# Patient Record
Sex: FEMALE | Race: WHITE
[De-identification: names, ages, dates, MRNs, and addresses within clinical notes are randomized per-mention and may not be internally consistent; named-entity substitution may affect disease eponyms.]

---

## 2018-03-15 ENCOUNTER — APPOINTMENT (OUTPATIENT)
Dept: UROLOGY | Facility: CLINIC | Age: 48
End: 2018-03-15

## 2018-04-25 ENCOUNTER — TRANSCRIPTION ENCOUNTER (OUTPATIENT)
Age: 48
End: 2018-04-25

## 2019-12-10 ENCOUNTER — TRANSCRIPTION ENCOUNTER (OUTPATIENT)
Age: 49
End: 2019-12-10

## 2020-04-30 ENCOUNTER — TRANSCRIPTION ENCOUNTER (OUTPATIENT)
Age: 50
End: 2020-04-30

## 2020-05-08 ENCOUNTER — APPOINTMENT (OUTPATIENT)
Dept: OTOLARYNGOLOGY | Facility: CLINIC | Age: 50
End: 2020-05-08

## 2020-05-12 ENCOUNTER — APPOINTMENT (OUTPATIENT)
Dept: OTOLARYNGOLOGY | Facility: CLINIC | Age: 50
End: 2020-05-12
Payer: MEDICAID

## 2020-05-12 VITALS
BODY MASS INDEX: 34.02 KG/M2 | WEIGHT: 192 LBS | HEART RATE: 102 BPM | HEIGHT: 63 IN | SYSTOLIC BLOOD PRESSURE: 150 MMHG | DIASTOLIC BLOOD PRESSURE: 101 MMHG | TEMPERATURE: 97.5 F

## 2020-05-12 DIAGNOSIS — H92.01 OTALGIA, RIGHT EAR: ICD-10-CM

## 2020-05-12 DIAGNOSIS — M26.621 ARTHRALGIA OF RIGHT TEMPOROMANDIBULAR JOINT: ICD-10-CM

## 2020-05-12 DIAGNOSIS — L29.9 PRURITUS, UNSPECIFIED: ICD-10-CM

## 2020-05-12 DIAGNOSIS — H61.23 IMPACTED CERUMEN, BILATERAL: ICD-10-CM

## 2020-05-12 PROCEDURE — G0268 REMOVAL OF IMPACTED WAX MD: CPT

## 2020-05-12 PROCEDURE — 99204 OFFICE O/P NEW MOD 45 MIN: CPT | Mod: 25

## 2020-05-12 PROCEDURE — 92567 TYMPANOMETRY: CPT

## 2020-05-12 PROCEDURE — 92557 COMPREHENSIVE HEARING TEST: CPT

## 2020-05-12 RX ORDER — MOMETASONE FUROATE 1 MG/G
0.1 CREAM TOPICAL DAILY
Qty: 1 | Refills: 0 | Status: ACTIVE | COMMUNITY
Start: 2020-05-12 | End: 1900-01-01

## 2020-05-12 NOTE — CONSULT LETTER
[Dear  ___] : Dear  [unfilled], [Consult Letter:] : I had the pleasure of evaluating your patient, [unfilled]. [Please see my note below.] : Please see my note below. [Consult Closing:] : Thank you very much for allowing me to participate in the care of this patient.  If you have any questions, please do not hesitate to contact me. [Sincerely,] : Sincerely, [FreeTextEntry3] : Jairo Dubose MD\par [facility]\par Otolaryngology and Facial Plastics\par [employer] Professor, [employer]\par

## 2020-05-12 NOTE — REASON FOR VISIT
[Initial Consultation] : an initial consultation for [FreeTextEntry2] : right ear, itching, infections

## 2020-05-12 NOTE — REVIEW OF SYSTEMS
[Sneezing] : sneezing [Seasonal Allergies] : seasonal allergies [Post Nasal Drip] : post nasal drip [Ear Pain] : ear pain [Ear Itch] : ear itch [Recurrent Ear Infections] : recurrent ear infections [Eyes Itch] : itching of the eyes [Joint Pain] : joint pain [Anxiety] : anxiety [Depression] : depression [Muscle Weakness] : muscle weakness [Negative] : Heme/Lymph

## 2020-05-12 NOTE — ASSESSMENT
[FreeTextEntry1] : Pt complaining right ear pain and itching. On exan cerumen removed mild right sided TMJ audiogram wnl nl early presycusis. doing well. reassured given elocan cream will f/u as needed.

## 2020-05-12 NOTE — HISTORY OF PRESENT ILLNESS
[de-identified] : Patient started to have right ear pain and itching in the right ear and was givern ofloxacin. After using this for a few weeks she did not have any improvement. Eventually she went back to her doctor and was given augmentin for 10 days. \par She was told that  she had wax in the ears that was dry. SHe is having moderate dull aching pain that comes and goes and she has a lot of desire to scratch the right ear due to itching. SHe does not think that her hearing has changed at all since this began. She does not have any ringing in the ears or dizziness since this started. SHe does admit that she has a lot of anxiety and this has worsened it. SHe states this started when she was asleep in December, woke up, touched her ear after touching the floor. She does not have any nasal congestion or runny nose today

## 2020-11-17 ENCOUNTER — EMERGENCY (EMERGENCY)
Facility: HOSPITAL | Age: 50
LOS: 0 days | Discharge: HOME | End: 2020-11-17
Attending: EMERGENCY MEDICINE
Payer: MEDICAID

## 2020-11-17 VITALS
HEART RATE: 88 BPM | OXYGEN SATURATION: 100 % | SYSTOLIC BLOOD PRESSURE: 177 MMHG | TEMPERATURE: 99 F | RESPIRATION RATE: 18 BRPM | DIASTOLIC BLOOD PRESSURE: 96 MMHG

## 2020-11-17 VITALS — WEIGHT: 190.04 LBS | HEIGHT: 63 IN

## 2020-11-17 DIAGNOSIS — I10 ESSENTIAL (PRIMARY) HYPERTENSION: ICD-10-CM

## 2020-11-17 DIAGNOSIS — R51.9 HEADACHE, UNSPECIFIED: ICD-10-CM

## 2020-11-17 DIAGNOSIS — K04.7 PERIAPICAL ABSCESS WITHOUT SINUS: ICD-10-CM

## 2020-11-17 DIAGNOSIS — Z91.040 LATEX ALLERGY STATUS: ICD-10-CM

## 2020-11-17 DIAGNOSIS — F41.9 ANXIETY DISORDER, UNSPECIFIED: ICD-10-CM

## 2020-11-17 PROCEDURE — 99283 EMERGENCY DEPT VISIT LOW MDM: CPT

## 2020-11-17 RX ORDER — OXYCODONE HYDROCHLORIDE 5 MG/1
5 TABLET ORAL ONCE
Refills: 0 | Status: DISCONTINUED | OUTPATIENT
Start: 2020-11-17 | End: 2020-11-17

## 2020-11-17 RX ORDER — IBUPROFEN 200 MG
1 TABLET ORAL
Qty: 20 | Refills: 0
Start: 2020-11-17

## 2020-11-17 RX ORDER — PENICILLIN V POTASSIUM 250 MG
1 TABLET ORAL
Qty: 21 | Refills: 0
Start: 2020-11-17 | End: 2020-11-23

## 2020-11-17 RX ORDER — ACETAMINOPHEN 500 MG
1000 TABLET ORAL ONCE
Refills: 0 | Status: COMPLETED | OUTPATIENT
Start: 2020-11-17 | End: 2020-11-17

## 2020-11-17 RX ORDER — IBUPROFEN 200 MG
600 TABLET ORAL ONCE
Refills: 0 | Status: COMPLETED | OUTPATIENT
Start: 2020-11-17 | End: 2020-11-17

## 2020-11-17 RX ADMIN — OXYCODONE HYDROCHLORIDE 5 MILLIGRAM(S): 5 TABLET ORAL at 17:51

## 2020-11-17 RX ADMIN — Medication 1 TABLET(S): at 17:50

## 2020-11-17 RX ADMIN — Medication 600 MILLIGRAM(S): at 17:51

## 2020-11-17 RX ADMIN — Medication 1000 MILLIGRAM(S): at 17:51

## 2020-11-17 NOTE — ED STATDOCS - PHYSICAL EXAMINATION
*GEN: No acute distress, well appearing   *HEAD: Normocephalic, Atraumatic +right sided facial swelling, TTP, minimal fluctuance and mild redness   *EYES/NOSE: b/l Pupils symmetric & Reactive to ligth, EOMI b/l  *THROAT: airway patent, moist mucous membranes +right lower molar cavities with decaying tooth   *NECK: Neck supple  *PULMONARY: No Respiratory distress, symmetric b/l chest rise  *CARDIAC: s1s2, regular rhythm   *ABDOMEN:  Non Tender, Non Distended, soft, no guarding, no rebound, no masses   *BACK: no CVA tenderness, No midline vertebral tenderness to palpation   *EXTREMITIES: symmetric pulses, 2+ DP & radial pulses, no cyanosis, no edema   *SKIN: no rash, no bruising   *NEUROLOGIC: alert,  full active & passive ROM in all 4 extremities,   *PSYCH: appropriate concern about symptoms, pleasant

## 2020-11-17 NOTE — ED STATDOCS - PROGRESS NOTE DETAILS
signed Lsia Soliman PA-C Pt seen initially in intake by Dr. Castillo. Pt declines  services.   49F c/o right sided dental pain, likely early abscess, has dentist appt on 11/19. Pt afebrile, mild right sided facial swelling, pt alert, non toxic, no trismus. DC with rx motrin, perocet, pen vk. Pt feeling well at DC, agrees with DC and plan of care. return precautions given. pt getting ride home from son. signed Lisa Soliman PA-C Pt seen initially in intake by Dr. Castillo. Pt declines  services.   49F c/o right sided dental pain, likely early abscess, has dentist appt on 11/19. Pt afebrile, mild right sided facial swelling, pt alert, non toxic, no trismus. DC with rx motrin, percocet, augmentin. Pt feeling well at DC, agrees with DC and plan of care. return precautions given. pt getting ride home from son.

## 2020-11-17 NOTE — ED STATDOCS - ATTENDING CONTRIBUTION TO CARE
I, Allan Castillo MD,  performed the initial face to face bedside interview with this patient regarding history of present illness, review of symptoms and relevant past medical, social and family history.  I completed an independent physical examination.  I was the initial provider who evaluated this patient. I have signed out the follow up of any pending tests (i.e. labs, radiological studies) to the ACP.  The ACP will complete the work up, interpret tests, administer medications if necessary and reassess the patient for an appropriate disposition.  If questions arise the ACP is expected to contact me for consultation. In the current work-flow I usually don't get to speak to nor reassess patients for final disposition once I sign the case out to the ACP (Unless otherwise documented).

## 2020-11-17 NOTE — ED ADULT NURSE NOTE - OBJECTIVE STATEMENT
Pt c/o right side face swelling radiating to her neck . pt has appointment with  dentist in 2 days .was send from urgent care denies fever .

## 2020-11-17 NOTE — ED STATDOCS - NSFOLLOWUPINSTRUCTIONS_ED_ALL_ED_FT
Dental Abscess       A dental abscess is a collection of pus in or around a tooth that results from an infection. An abscess can cause pain in the affected area as well as other symptoms. Treatment is important to help with symptoms and to prevent the infection from spreading.      What are the causes?  This condition is caused by a bacterial infection around the root of the tooth that involves the inner part of the tooth (pulp). It may result from:  •Severe tooth decay.      •Trauma to the tooth, such as a broken or chipped tooth, that allows bacteria to enter into the pulp.      •Severe gum disease around a tooth.        What increases the risk?  This condition is more likely to develop in males. It is also more likely to develop in people who:  •Have dental decay (cavities).      •Eat sugary snacks between meals.      •Use tobacco products.      •Have diabetes.      •Have a weakened disease-fighting system (immune system).      •Do not brush and care for their teeth regularly.        What are the signs or symptoms?  Symptoms of this condition include:  •Severe pain in and around the infected tooth.      •Swelling and redness around the infected tooth, in the mouth, or in the face.      •Tenderness.      •Pus drainage.      •Bad breath.      •Bitter taste in the mouth.      •Difficulty swallowing.      •Difficulty opening the mouth.      •Nausea.      •Vomiting.      •Chills.      •Swollen neck glands.      •Fever.        How is this diagnosed?  This condition is diagnosed based on:  •Your symptoms and your medical and dental history.      •An examination of the infected tooth. During the exam, your dentist may tap on the infected tooth.      You may also have X-rays of the affected area.      How is this treated?  This condition is treated by getting rid of the infection. This may be done with:  •Incision and drainage. This procedure is done by making an incision in the abscess to drain out the pus. Removing pus is the first priority in treating an abscess.      •Antibiotic medicines. These may be used in certain situations.      •Antibacterial mouth rinse.      •A root canal. This may be performed to save the tooth. Your dentist accesses the visible part of your tooth (crown) with a drill and removes any damaged pulp. Then the space is filled and sealed off.      •Tooth extraction. The tooth is pulled out if it cannot be saved by other treatment.    You may also receive treatment for pain, such as:  •Acetaminophen or NSAIDs.      •Gels that contain a numbing medicine.      •An injection to block the pain near your nerve.        Follow these instructions at home:    Medicines     •Take over-the-counter and prescription medicines only as told by your dentist.      •If you were prescribed an antibiotic, take it as told by your dentist. Do not stop taking the antibiotic even if you start to feel better.      •If you were prescribed a gel that contains a numbing medicine, use it exactly as told in the directions. Do not use these gels for children who are younger than 2 years of age.      • Do not drive or use heavy machinery while taking prescription pain medicine.      General instructions     •Rinse out your mouth often with salt water to relieve pain or swelling. To make a salt-water mixture, completely dissolve ½–1 tsp of salt in 1 cup of warm water.      •Eat a soft diet while your abscess is healing.      •Drink enough fluid to keep your urine pale yellow.      • Do not apply heat to the outside of your mouth.      • Do not use any products that contain nicotine or tobacco, such as cigarettes and e-cigarettes. If you need help quitting, ask your health care provider.      •Keep all follow-up visits as told by your dentist. This is important.        How is this prevented?    •Brush your teeth every morning and night with fluoride toothpaste. Floss one time each day.      •Get regularly scheduled dental cleanings.      •Consider having a dental sealant applied on teeth that have deep holes (caries).      •Drink fluoridated water regularly. This includes most tap water. Check the label on bottled water to see if it contains fluoride.      •Drink water instead of sugary drinks.      •Eat healthy meals and snacks.      •Wear a mouth guard or face shield to protect your teeth while playing sports.        Contact a health care provider if:    •Your pain is worse and is not helped by medicine.        Get help right away if:    •You have a fever or chills.      •Your symptoms suddenly get worse.      •You have a very bad headache.      •You have problems breathing or swallowing.      •You have trouble opening your mouth.      •You have swelling in your neck or around your eye.        Summary    •A dental abscess is a collection of pus in or around a tooth that results from an infection.      •A dental abscess may result from severe tooth decay, trauma to the tooth, or severe gum disease around a tooth.      •Symptoms include severe pain, swelling, redness, and drainage of pus in and around the infected tooth.      •The first priority in treating a dental abscess is to drain out the pus. Treatment may also involve removing damage inside the tooth (root canal) or pulling out (extracting) the tooth.      This information is not intended to replace advice given to you by your health care provider. Make sure you discuss any questions you have with your health care provider.      Document Revised: 11/30/2018 Document Reviewed: 08/20/2018    [employer] Patient Education © 2020 [employer]    FOLLOW UP WITH YOUR DENTIST IN 1-2 DAYS. RETURN TO ER FOR ANY WORSENING SYMPTOMS OR NEW CONCERNS.   TAKE THE ANTIBIOTICS EVERY DAY. FOR PAIN, TAKE THE MOTRIN EVERY 6 HOURS. IF PAIN IS SEVERE TAKE THE PERCOCET AS WELL, IT IS OK TO COMBINE THE MOTRIN AND PERCOCET.

## 2020-11-17 NOTE — ED ADULT NURSE NOTE - CHIEF COMPLAINT QUOTE
Patient comes in with R sided facial pain and neck pain. Patient sent in from urgent care to r/o infection. 16-Aug-2017 15:51:16

## 2020-11-17 NOTE — ED STATDOCS - NS ED ROS FT
Review of Systems:  	•	CONSTITUTIONAL: no fever  	•	SKIN: no rash +facial pain, swelling                              MOUTH: +right sided tooth pain  	•	RESPIRATORY: no shortness of breath  	•	CARDIAC: no chest pain  	•	GI:  no abd pain, no nausea, no vomiting, no diarrhea  	•	GENITO-URINARY:  no dysuria  	•	MUSCULOSKELETAL:  no back pain  	•	NEUROLOGIC: no weakness  	•	ALLERGY: no rhinorrhea  	•	PSYSCHIATRIC: appropriate concern about symptoms

## 2020-11-17 NOTE — ED ADULT TRIAGE NOTE - CHIEF COMPLAINT QUOTE
Patient comes in with R sided facial pain and neck pain. Patient sent in from urgent care to r/o infection.

## 2020-11-17 NOTE — ED STATDOCS - OBJECTIVE STATEMENT
Pertinent HPI/PMH/PSH/FHx/SHx and Review of Systems contained within  HPI:  Patient p/w CC right sided facial pain, swelling x yesterday, new onset. Pt states she was brushing her teeth roughly last night, prior to the pain starting. Associated right sided tooth pain. Pt states Sent in from Urgent Care to r/o infection. Has a dentist appointment in two days.   PMH/PSH relevant for: HTN, anxiety, periodontitis, congential adrenal hyperplasia   ROS negative for: fever, Chest pain, SOB, Nausea, vomiting, diarrhea, abdominal pain, dysuria    FamilyHx and SocialHx not otherwise contributory Pertinent HPI/PMH/PSH/FHx/SHx and Review of Systems contained within  HPI:  Patient p/w CC right sided facial pain, swelling x yesterday, new onset. Pt states she was brushing her teeth roughly last night, prior to the pain starting. Associated right sided tooth pain. Pt states Sent in from Urgent Care to r/o infection. Has a dentist appointment in two days. NKDA.   PMH/PSH relevant for: HTN, anxiety, periodontitis, congential adrenal hyperplasia   ROS negative for: fever, Chest pain, SOB, Nausea, vomiting, diarrhea, abdominal pain, dysuria    FamilyHx and SocialHx not otherwise contributory

## 2020-11-17 NOTE — ED STATDOCS - PATIENT PORTAL LINK FT
You can access the FollowMyHealth Patient Portal offered by [facility] by registering at the following website: [email or web address]. By joining [employer]’s FollowMyHealth portal, you will also be able to view your health information using other applications (apps) compatible with our system.

## 2020-11-17 NOTE — ED ADULT NURSE NOTE - NSIMPLEMENTINTERV_GEN_ALL_ED
Implemented All Universal Safety Interventions:  [place] to call system. Call bell, personal items and telephone within reach. Instruct patient to call for assistance. Room bathroom lighting operational. Non-slip footwear when patient is off stretcher. Physically safe environment: no spills, clutter or unnecessary equipment. Stretcher in lowest position, wheels locked, appropriate side rails in place.

## 2020-11-17 NOTE — ED STATDOCS - CLINICAL SUMMARY MEDICAL DECISION MAKING FREE TEXT BOX
Symptoms likely due to beginnings of buccal space abscess and infection, which is not fluctuant, does not appear ready to be drained will start on abx. Pt has dental follow up in 2 days. Symptoms likely due to beginnings of buccal space abscess and infection, which is not fluctuant, does not appear ready to be drained will start on abx. Pt has dental follow up in 2 days. pt prefers to be started on antibiotics prior to I&D

## 2020-11-18 ENCOUNTER — INPATIENT (INPATIENT)
Facility: HOSPITAL | Age: 50
LOS: 1 days | Discharge: HOME | DRG: 114 | End: 2020-11-20
Attending: HOSPITALIST | Admitting: STUDENT IN AN ORGANIZED HEALTH CARE EDUCATION/TRAINING PROGRAM
Payer: MEDICAID

## 2020-11-18 VITALS — WEIGHT: 190.04 LBS | HEIGHT: 63 IN

## 2020-11-18 DIAGNOSIS — K04.7 PERIAPICAL ABSCESS WITHOUT SINUS: ICD-10-CM

## 2020-11-18 DIAGNOSIS — Z90.89 ACQUIRED ABSENCE OF OTHER ORGANS: Chronic | ICD-10-CM

## 2020-11-18 LAB
ALBUMIN SERPL ELPH-MCNC: 3.8 G/DL — SIGNIFICANT CHANGE UP (ref 3.3–5)
ALP SERPL-CCNC: 71 U/L — SIGNIFICANT CHANGE UP (ref 40–120)
ALT FLD-CCNC: 22 U/L — SIGNIFICANT CHANGE UP (ref 12–78)
ANION GAP SERPL CALC-SCNC: 5 MMOL/L — SIGNIFICANT CHANGE UP (ref 5–17)
AST SERPL-CCNC: 8 U/L — LOW (ref 15–37)
BASOPHILS # BLD AUTO: 0.06 K/UL — SIGNIFICANT CHANGE UP (ref 0–0.2)
BASOPHILS NFR BLD AUTO: 0.6 % — SIGNIFICANT CHANGE UP (ref 0–2)
BILIRUB SERPL-MCNC: 0.3 MG/DL — SIGNIFICANT CHANGE UP (ref 0.2–1.2)
BUN SERPL-MCNC: 15 MG/DL — SIGNIFICANT CHANGE UP (ref 7–23)
CALCIUM SERPL-MCNC: 9.1 MG/DL — SIGNIFICANT CHANGE UP (ref 8.5–10.1)
CHLORIDE SERPL-SCNC: 111 MMOL/L — HIGH (ref 96–108)
CO2 SERPL-SCNC: 27 MMOL/L — SIGNIFICANT CHANGE UP (ref 22–31)
CREAT SERPL-MCNC: 0.83 MG/DL — SIGNIFICANT CHANGE UP (ref 0.5–1.3)
EOSINOPHIL # BLD AUTO: 0.15 K/UL — SIGNIFICANT CHANGE UP (ref 0–0.5)
EOSINOPHIL NFR BLD AUTO: 1.4 % — SIGNIFICANT CHANGE UP (ref 0–6)
GLUCOSE SERPL-MCNC: 130 MG/DL — HIGH (ref 70–99)
HCT VFR BLD CALC: 42.1 % — SIGNIFICANT CHANGE UP (ref 34.5–45)
HGB BLD-MCNC: 13.9 G/DL — SIGNIFICANT CHANGE UP (ref 11.5–15.5)
IMM GRANULOCYTES NFR BLD AUTO: 0.4 % — SIGNIFICANT CHANGE UP (ref 0–1.5)
LYMPHOCYTES # BLD AUTO: 2.8 K/UL — SIGNIFICANT CHANGE UP (ref 1–3.3)
LYMPHOCYTES # BLD AUTO: 26.9 % — SIGNIFICANT CHANGE UP (ref 13–44)
MCHC RBC-ENTMCNC: 28 PG — SIGNIFICANT CHANGE UP (ref 27–34)
MCHC RBC-ENTMCNC: 33 GM/DL — SIGNIFICANT CHANGE UP (ref 32–36)
MCV RBC AUTO: 84.9 FL — SIGNIFICANT CHANGE UP (ref 80–100)
MONOCYTES # BLD AUTO: 0.64 K/UL — SIGNIFICANT CHANGE UP (ref 0–0.9)
MONOCYTES NFR BLD AUTO: 6.1 % — SIGNIFICANT CHANGE UP (ref 2–14)
NEUTROPHILS # BLD AUTO: 6.73 K/UL — SIGNIFICANT CHANGE UP (ref 1.8–7.4)
NEUTROPHILS NFR BLD AUTO: 64.6 % — SIGNIFICANT CHANGE UP (ref 43–77)
PLATELET # BLD AUTO: 260 K/UL — SIGNIFICANT CHANGE UP (ref 150–400)
POTASSIUM SERPL-MCNC: 3.6 MMOL/L — SIGNIFICANT CHANGE UP (ref 3.5–5.3)
POTASSIUM SERPL-SCNC: 3.6 MMOL/L — SIGNIFICANT CHANGE UP (ref 3.5–5.3)
PROT SERPL-MCNC: 8 GM/DL — SIGNIFICANT CHANGE UP (ref 6–8.3)
RBC # BLD: 4.96 M/UL — SIGNIFICANT CHANGE UP (ref 3.8–5.2)
RBC # FLD: 13.4 % — SIGNIFICANT CHANGE UP (ref 10.3–14.5)
SARS-COV-2 RNA SPEC QL NAA+PROBE: SIGNIFICANT CHANGE UP
SODIUM SERPL-SCNC: 143 MMOL/L — SIGNIFICANT CHANGE UP (ref 135–145)
WBC # BLD: 10.42 K/UL — SIGNIFICANT CHANGE UP (ref 3.8–10.5)
WBC # FLD AUTO: 10.42 K/UL — SIGNIFICANT CHANGE UP (ref 3.8–10.5)

## 2020-11-18 PROCEDURE — 70487 CT MAXILLOFACIAL W/DYE: CPT | Mod: 26

## 2020-11-18 PROCEDURE — 80061 LIPID PANEL: CPT

## 2020-11-18 PROCEDURE — 71045 X-RAY EXAM CHEST 1 VIEW: CPT | Mod: 26

## 2020-11-18 PROCEDURE — 80053 COMPREHEN METABOLIC PANEL: CPT

## 2020-11-18 PROCEDURE — 83735 ASSAY OF MAGNESIUM: CPT

## 2020-11-18 PROCEDURE — 93005 ELECTROCARDIOGRAM TRACING: CPT

## 2020-11-18 PROCEDURE — G0378: CPT

## 2020-11-18 PROCEDURE — 99223 1ST HOSP IP/OBS HIGH 75: CPT

## 2020-11-18 PROCEDURE — 93010 ELECTROCARDIOGRAM REPORT: CPT

## 2020-11-18 PROCEDURE — 84100 ASSAY OF PHOSPHORUS: CPT

## 2020-11-18 PROCEDURE — 83036 HEMOGLOBIN GLYCOSYLATED A1C: CPT

## 2020-11-18 PROCEDURE — 36415 COLL VENOUS BLD VENIPUNCTURE: CPT

## 2020-11-18 PROCEDURE — 85025 COMPLETE CBC W/AUTO DIFF WBC: CPT

## 2020-11-18 RX ORDER — ACETAMINOPHEN 500 MG
650 TABLET ORAL ONCE
Refills: 0 | Status: DISCONTINUED | OUTPATIENT
Start: 2020-11-18 | End: 2020-11-18

## 2020-11-18 RX ORDER — VENLAFAXINE HCL 75 MG
150 CAPSULE, EXT RELEASE 24 HR ORAL DAILY
Refills: 0 | Status: DISCONTINUED | OUTPATIENT
Start: 2020-11-18 | End: 2020-11-20

## 2020-11-18 RX ORDER — VENLAFAXINE HCL 75 MG
1 CAPSULE, EXT RELEASE 24 HR ORAL
Qty: 0 | Refills: 0 | DISCHARGE

## 2020-11-18 RX ORDER — SACCHAROMYCES BOULARDII 250 MG
250 POWDER IN PACKET (EA) ORAL
Refills: 0 | Status: DISCONTINUED | OUTPATIENT
Start: 2020-11-18 | End: 2020-11-20

## 2020-11-18 RX ORDER — OXYCODONE HYDROCHLORIDE 5 MG/1
5 TABLET ORAL EVERY 4 HOURS
Refills: 0 | Status: DISCONTINUED | OUTPATIENT
Start: 2020-11-18 | End: 2020-11-18

## 2020-11-18 RX ORDER — CETIRIZINE HYDROCHLORIDE 10 MG/1
1 TABLET ORAL
Qty: 0 | Refills: 0 | DISCHARGE

## 2020-11-18 RX ORDER — METOPROLOL TARTRATE 50 MG
1 TABLET ORAL
Qty: 0 | Refills: 0 | DISCHARGE

## 2020-11-18 RX ORDER — SERTRALINE 25 MG/1
100 TABLET, FILM COATED ORAL DAILY
Refills: 0 | Status: DISCONTINUED | OUTPATIENT
Start: 2020-11-18 | End: 2020-11-20

## 2020-11-18 RX ORDER — OXYBUTYNIN CHLORIDE 5 MG
1 TABLET ORAL
Qty: 0 | Refills: 0 | DISCHARGE

## 2020-11-18 RX ORDER — AMPICILLIN SODIUM AND SULBACTAM SODIUM 250; 125 MG/ML; MG/ML
3 INJECTION, POWDER, FOR SUSPENSION INTRAMUSCULAR; INTRAVENOUS EVERY 6 HOURS
Refills: 0 | Status: DISCONTINUED | OUTPATIENT
Start: 2020-11-18 | End: 2020-11-20

## 2020-11-18 RX ORDER — KETOROLAC TROMETHAMINE 30 MG/ML
30 SYRINGE (ML) INJECTION EVERY 6 HOURS
Refills: 0 | Status: DISCONTINUED | OUTPATIENT
Start: 2020-11-18 | End: 2020-11-20

## 2020-11-18 RX ORDER — OXYCODONE AND ACETAMINOPHEN 5; 325 MG/1; MG/1
1 TABLET ORAL ONCE
Refills: 0 | Status: DISCONTINUED | OUTPATIENT
Start: 2020-11-18 | End: 2020-11-18

## 2020-11-18 RX ORDER — LORATADINE 10 MG/1
10 TABLET ORAL DAILY
Refills: 0 | Status: DISCONTINUED | OUTPATIENT
Start: 2020-11-18 | End: 2020-11-20

## 2020-11-18 RX ORDER — OXYCODONE AND ACETAMINOPHEN 5; 325 MG/1; MG/1
1 TABLET ORAL EVERY 6 HOURS
Refills: 0 | Status: DISCONTINUED | OUTPATIENT
Start: 2020-11-18 | End: 2020-11-20

## 2020-11-18 RX ORDER — ACETAMINOPHEN 500 MG
650 TABLET ORAL EVERY 4 HOURS
Refills: 0 | Status: DISCONTINUED | OUTPATIENT
Start: 2020-11-18 | End: 2020-11-18

## 2020-11-18 RX ORDER — SERTRALINE 25 MG/1
1 TABLET, FILM COATED ORAL
Qty: 0 | Refills: 0 | DISCHARGE

## 2020-11-18 RX ORDER — OXYBUTYNIN CHLORIDE 5 MG
5 TABLET ORAL EVERY 8 HOURS
Refills: 0 | Status: DISCONTINUED | OUTPATIENT
Start: 2020-11-18 | End: 2020-11-20

## 2020-11-18 RX ORDER — METOPROLOL TARTRATE 50 MG
25 TABLET ORAL DAILY
Refills: 0 | Status: DISCONTINUED | OUTPATIENT
Start: 2020-11-18 | End: 2020-11-20

## 2020-11-18 RX ORDER — ACETAMINOPHEN 500 MG
650 TABLET ORAL EVERY 4 HOURS
Refills: 0 | Status: DISCONTINUED | OUTPATIENT
Start: 2020-11-18 | End: 2020-11-20

## 2020-11-18 RX ADMIN — Medication 100 MILLIGRAM(S): at 16:29

## 2020-11-18 RX ADMIN — AMPICILLIN SODIUM AND SULBACTAM SODIUM 200 GRAM(S): 250; 125 INJECTION, POWDER, FOR SUSPENSION INTRAMUSCULAR; INTRAVENOUS at 23:30

## 2020-11-18 NOTE — ED STATDOCS - CADM POA CENTRAL LINE
Called patient for refill reminder.    Will mail prescriptions address has been verified.         Last Filled on: 07/03/20   Follow-up Date: 08/27/20    Zuly Sampson CPhT  [facility]  [phone]        No

## 2020-11-18 NOTE — H&P ADULT - NSHPSOCIALHISTORY_GEN_ALL_CORE
Lives at home with son  Denies tobacco use  Denies EtOH use  No illicit drug use  No Mammo or PAP smear in a while due to anxiety

## 2020-11-18 NOTE — H&P ADULT - NSHPREVIEWOFSYSTEMS_GEN_ALL_CORE
General: no fever, chills  Eyes: no vision changes  ENT: no hearing changes, nasal congestion, or sore throat; admits right tooth pain and facial swelling  CV: no chest pain or palpitations  Pulm: no SOB, wheezing, cough, or hemoptysis  Abd/GI: no nausea, vomiting, diarrhea, constipation, abd pain  : no dysuria, hematuria, urinary frequency; vaginal itchiness  MSK: no joint pain or myalgias  Neuro: no syncope, dizziness, focal weakness  Psych: no anxiety or depression  Endo: no heat or cold intolerance

## 2020-11-18 NOTE — H&P ADULT - NSHPPHYSICALEXAM_GEN_ALL_CORE
T(C): 36.8 (18 Nov 2020 18:25), Max: 36.8 (18 Nov 2020 15:44)  T(F): 98.2 (18 Nov 2020 18:25), Max: 98.2 (18 Nov 2020 15:44)  HR: 80 (18 Nov 2020 18:25) (80 - 95)  BP: 138/70 (18 Nov 2020 18:25) (138/70 - 140/79)  BP(mean): 83 (18 Nov 2020 18:25) (83 - 96)  RR: 17 (18 Nov 2020 18:25) (17 - 18)  SpO2: 98% (18 Nov 2020 18:25) (98% - 98%)    Limited Physical via telemedicine; nocturnist assessment to follow  Gen: in no acute distress  HEENT: right sided facial swelling Physical Exam:   GENERAL APPEARANCE:  NAD, hemodynamically stable  T(C): 36.8 (11-18-20 @ 18:25), Max: 36.8 (11-18-20 @ 15:44)  HR: 72 (11-18-20 @ 23:01) (72 - 95)  BP: 132/65 (11-18-20 @ 23:01) (132/65 - 140/79)  RR: 16 (11-18-20 @ 23:01) (16 - 18)  SpO2: 98% (11-18-20 @ 23:01) (98% - 98%)  Wt(kg): --   Skin:  Warm and dry without any rash   NECK:  Supple without lymphadenopathy.   HEART:  Regular rate and rhythm. normal S1 and S2, No M/R/G  LUNGS:  Good ins/exp effort, no W/R/R/C  ABDOMEN:  Soft, nontender, nondistended with good bowel sounds heard  EXTREMITIES:  Without cyanosis, clubbing or edema.   NEUROLOGICAL:  Gross nonfocal   Gen: in no acute distress  HEENT: right sided facial swelling

## 2020-11-18 NOTE — ED STATDOCS - PROGRESS NOTE DETAILS
signed Lisa Soliman PA-C Pt seen initially in intake by Dr Flores.   49F c/o worsened facial swelling and pain, pt seen in ED yesterday for dental abscess, DCed on Augmentin, has dentist appt in 2 days but swelling worsened significantly today. Denies fever. No significant findings on labwork. CT shows small periapical abscess. Will admit for IV abx. Pt agrees with admission and plan of care. Case discussed with and admission accepted by hospitalist Dr. Reyes.

## 2020-11-18 NOTE — H&P ADULT - ASSESSMENT
50 y/o F with PMHx of HTN, anxiety, urinary incontinence admitted with dental abscess.    #Subperiosteal abscess  - admit to medicine  - CT with right maxillary second molar tooth findings concerning for abscess  - start unasyn q6hrs for now; probiotics while on abx  - follow up blood cultures  - monitor for fevers  - pain control with tylenol, percocet and toradol  - OMFS consult    #HTN  - continue toprol XL 25mg daily  - monitor hemodynamics    #Anxiety  - continue venlafaxine  - continue sertraline    #Urinary incontinence  - continue oxybutnin (5mg TID for 15mg ER)    #Vaginal candidiasis  - monitor - was prescribed fluconazole but told to hold    #Need for prophylactic measure  - VTE ppx: SCDs, ambulate as tolerated 50 y/o F with PMHx of HTN, anxiety, urinary incontinence admitted with dental abscess.    # Subperiosteal abscess  - admit to medicine  - CT with right maxillary second molar tooth findings concerning for abscess  - start unasyn q6hrs for now; probiotics while on abx  - follow up blood cultures  - monitor for fevers  - pain control with tylenol, percocet and toradol  - OMFS consult    #HTN  - continue toprol XL 25mg daily  - monitor hemodynamics    #Anxiety  - continue venlafaxine  - continue sertraline    #Urinary incontinence  - continue oxybutnin (5mg TID for 15mg ER)    #Vaginal candidiasis  - monitor - was prescribed fluconazole but told to hold    #Need for prophylactic measure  - VTE ppx: SCDs, ambulate as tolerated

## 2020-11-18 NOTE — H&P ADULT - HISTORY OF PRESENT ILLNESS
50 y/o F with PMHx of HTN, anxiety, urinary incontinence presented to the ED complaining of tooth pain. She started to feel pain on the right bottom side of her mouth the day before yesterday. Then, she started developing pain and swelling from the bottom of her face up to her right eye. She was having a lot pain and was taking anti-inflammatories - ibuprofen with improvement. She presented to the ED due to worsening pain and swelling after ER visit. She received oxycodone and augmentin. Still getting worse so she she came back. No fevers or chills. Denies chest pain, palpitations, dyspnea, headache, dizziness, abdominal pain, n/v/d. Has some vaginal itchiness, which she was given fluconazole for but was told not to start yet.    In the ED, VSS  Labs unremarkable  CXR with no acute disease  CT showed periapical lucencies involving the right maxillary second molar tooth with buccal cortex dehiscence and suggestion of a subcentimeter subperiosteal abscess. Associated superficial soft tissue swelling noted.

## 2020-11-18 NOTE — ED STATDOCS - OBJECTIVE STATEMENT
48 y/o female with a PMHx of HTN on Metoprolol, anxiety, periodontitis, congential adrenal hyperplasia, presents to the ED c/o worsening R sided facial swelling since yesterday morning. Pt was seen in ED yesterday for same and dx with dental abscess and rx'd Motrin, Percocet and Augmentin. States pain has improved but swelling seems to be worse. Pt denies fever. Pt has an appointment with her dentist tomorrow.

## 2020-11-18 NOTE — ED ADULT TRIAGE NOTE - CHIEF COMPLAINT QUOTE
pt c/o worsening R. sided facial pain and swelling since yesterday morning. pt reports she was seen in ED yesterday and dx w/ a facial abscess, told to come back if it got worse and now it is worse.

## 2020-11-19 LAB
A1C WITH ESTIMATED AVERAGE GLUCOSE RESULT: 5.9 % — HIGH (ref 4–5.6)
ALBUMIN SERPL ELPH-MCNC: 3.7 G/DL — SIGNIFICANT CHANGE UP (ref 3.3–5)
ALP SERPL-CCNC: 67 U/L — SIGNIFICANT CHANGE UP (ref 40–120)
ALT FLD-CCNC: 18 U/L — SIGNIFICANT CHANGE UP (ref 12–78)
ANION GAP SERPL CALC-SCNC: 6 MMOL/L — SIGNIFICANT CHANGE UP (ref 5–17)
AST SERPL-CCNC: 13 U/L — LOW (ref 15–37)
BASOPHILS # BLD AUTO: 0.04 K/UL — SIGNIFICANT CHANGE UP (ref 0–0.2)
BASOPHILS NFR BLD AUTO: 0.4 % — SIGNIFICANT CHANGE UP (ref 0–2)
BILIRUB SERPL-MCNC: 0.4 MG/DL — SIGNIFICANT CHANGE UP (ref 0.2–1.2)
BUN SERPL-MCNC: 13 MG/DL — SIGNIFICANT CHANGE UP (ref 7–23)
CALCIUM SERPL-MCNC: 9.1 MG/DL — SIGNIFICANT CHANGE UP (ref 8.5–10.1)
CHLORIDE SERPL-SCNC: 106 MMOL/L — SIGNIFICANT CHANGE UP (ref 96–108)
CHOLEST SERPL-MCNC: 141 MG/DL — SIGNIFICANT CHANGE UP
CO2 SERPL-SCNC: 27 MMOL/L — SIGNIFICANT CHANGE UP (ref 22–31)
CREAT SERPL-MCNC: 0.75 MG/DL — SIGNIFICANT CHANGE UP (ref 0.5–1.3)
EOSINOPHIL # BLD AUTO: 0.13 K/UL — SIGNIFICANT CHANGE UP (ref 0–0.5)
EOSINOPHIL NFR BLD AUTO: 1.3 % — SIGNIFICANT CHANGE UP (ref 0–6)
ESTIMATED AVERAGE GLUCOSE: 123 MG/DL — HIGH (ref 68–114)
GLUCOSE SERPL-MCNC: 97 MG/DL — SIGNIFICANT CHANGE UP (ref 70–99)
HCT VFR BLD CALC: 39.9 % — SIGNIFICANT CHANGE UP (ref 34.5–45)
HDLC SERPL-MCNC: 42 MG/DL — LOW
HGB BLD-MCNC: 13 G/DL — SIGNIFICANT CHANGE UP (ref 11.5–15.5)
IMM GRANULOCYTES NFR BLD AUTO: 0.3 % — SIGNIFICANT CHANGE UP (ref 0–1.5)
LIPID PNL WITH DIRECT LDL SERPL: 88 MG/DL — SIGNIFICANT CHANGE UP
LYMPHOCYTES # BLD AUTO: 2.83 K/UL — SIGNIFICANT CHANGE UP (ref 1–3.3)
LYMPHOCYTES # BLD AUTO: 28.3 % — SIGNIFICANT CHANGE UP (ref 13–44)
MAGNESIUM SERPL-MCNC: 2 MG/DL — SIGNIFICANT CHANGE UP (ref 1.6–2.6)
MCHC RBC-ENTMCNC: 27.9 PG — SIGNIFICANT CHANGE UP (ref 27–34)
MCHC RBC-ENTMCNC: 32.6 GM/DL — SIGNIFICANT CHANGE UP (ref 32–36)
MCV RBC AUTO: 85.6 FL — SIGNIFICANT CHANGE UP (ref 80–100)
MONOCYTES # BLD AUTO: 0.57 K/UL — SIGNIFICANT CHANGE UP (ref 0–0.9)
MONOCYTES NFR BLD AUTO: 5.7 % — SIGNIFICANT CHANGE UP (ref 2–14)
NEUTROPHILS # BLD AUTO: 6.39 K/UL — SIGNIFICANT CHANGE UP (ref 1.8–7.4)
NEUTROPHILS NFR BLD AUTO: 64 % — SIGNIFICANT CHANGE UP (ref 43–77)
NON HDL CHOLESTEROL: 99 MG/DL — SIGNIFICANT CHANGE UP
PHOSPHATE SERPL-MCNC: 3.1 MG/DL — SIGNIFICANT CHANGE UP (ref 2.5–4.5)
PLATELET # BLD AUTO: 245 K/UL — SIGNIFICANT CHANGE UP (ref 150–400)
POTASSIUM SERPL-MCNC: 3.8 MMOL/L — SIGNIFICANT CHANGE UP (ref 3.5–5.3)
POTASSIUM SERPL-SCNC: 3.8 MMOL/L — SIGNIFICANT CHANGE UP (ref 3.5–5.3)
PROT SERPL-MCNC: 7.8 GM/DL — SIGNIFICANT CHANGE UP (ref 6–8.3)
RBC # BLD: 4.66 M/UL — SIGNIFICANT CHANGE UP (ref 3.8–5.2)
RBC # FLD: 13.5 % — SIGNIFICANT CHANGE UP (ref 10.3–14.5)
SODIUM SERPL-SCNC: 139 MMOL/L — SIGNIFICANT CHANGE UP (ref 135–145)
TRIGL SERPL-MCNC: 58 MG/DL — SIGNIFICANT CHANGE UP
WBC # BLD: 9.99 K/UL — SIGNIFICANT CHANGE UP (ref 3.8–10.5)
WBC # FLD AUTO: 9.99 K/UL — SIGNIFICANT CHANGE UP (ref 3.8–10.5)

## 2020-11-19 PROCEDURE — 99233 SBSQ HOSP IP/OBS HIGH 50: CPT

## 2020-11-19 RX ORDER — INFLUENZA VIRUS VACCINE 15; 15; 15; 15 UG/.5ML; UG/.5ML; UG/.5ML; UG/.5ML
0.5 SUSPENSION INTRAMUSCULAR ONCE
Refills: 0 | Status: DISCONTINUED | OUTPATIENT
Start: 2020-11-19 | End: 2020-11-20

## 2020-11-19 RX ADMIN — Medication 250 MILLIGRAM(S): at 09:24

## 2020-11-19 RX ADMIN — Medication 25 MILLIGRAM(S): at 09:23

## 2020-11-19 RX ADMIN — Medication 30 MILLIGRAM(S): at 10:30

## 2020-11-19 RX ADMIN — AMPICILLIN SODIUM AND SULBACTAM SODIUM 200 GRAM(S): 250; 125 INJECTION, POWDER, FOR SUSPENSION INTRAMUSCULAR; INTRAVENOUS at 17:51

## 2020-11-19 RX ADMIN — AMPICILLIN SODIUM AND SULBACTAM SODIUM 200 GRAM(S): 250; 125 INJECTION, POWDER, FOR SUSPENSION INTRAMUSCULAR; INTRAVENOUS at 05:54

## 2020-11-19 RX ADMIN — AMPICILLIN SODIUM AND SULBACTAM SODIUM 200 GRAM(S): 250; 125 INJECTION, POWDER, FOR SUSPENSION INTRAMUSCULAR; INTRAVENOUS at 12:31

## 2020-11-19 RX ADMIN — SERTRALINE 100 MILLIGRAM(S): 25 TABLET, FILM COATED ORAL at 22:05

## 2020-11-19 RX ADMIN — Medication 30 MILLIGRAM(S): at 09:35

## 2020-11-19 RX ADMIN — Medication 5 MILLIGRAM(S): at 05:54

## 2020-11-19 RX ADMIN — Medication 150 MILLIGRAM(S): at 09:24

## 2020-11-19 RX ADMIN — LORATADINE 10 MILLIGRAM(S): 10 TABLET ORAL at 09:23

## 2020-11-19 RX ADMIN — Medication 5 MILLIGRAM(S): at 22:05

## 2020-11-19 RX ADMIN — Medication 5 MILLIGRAM(S): at 13:35

## 2020-11-19 RX ADMIN — Medication 250 MILLIGRAM(S): at 22:05

## 2020-11-19 NOTE — PROGRESS NOTE ADULT - SUBJECTIVE AND OBJECTIVE BOX
Patient is a 49y old  Female who presents with a chief complaint of dental abscess (18 Nov 2020 20:17)      SUBJECTIVE:   HPI:  50 y/o F with PMHx of HTN, anxiety, urinary incontinence presented to the ED complaining of tooth pain. She started to feel pain on the right bottom side of her mouth the day before yesterday. Then, she started developing pain and swelling from the bottom of her face up to her right eye. She was having a lot pain and was taking anti-inflammatories - ibuprofen with improvement. She presented to the ED due to worsening pain and swelling after ER visit. She received oxycodone and augmentin. Still getting worse so she she came back. No fevers or chills. Denies chest pain, palpitations, dyspnea, headache, dizziness, abdominal pain, n/v/d. Has some vaginal itchiness, which she was given fluconazole for but was told not to start yet.      CT showed periapical lucencies involving the right maxillary second molar tooth with buccal cortex dehiscence and suggestion of a subcentimeter subperiosteal abscess. Associated superficial soft tissue swelling noted. Has a crown in right upper 3rd molar done approx 18 months ago.     sub: has mild pain / tenderness on palpation of right maxillary gum line. afebrile        REVIEW OF SYSTEMS:    CONSTITUTIONAL: No weakness, fevers or chills  EYES/ENT: No visual changes;  No vertigo or throat pain   NECK: No pain or stiffness  RESPIRATORY: No cough, wheezing, hemoptysis; No shortness of breath  CARDIOVASCULAR: No chest pain or palpitations  GASTROINTESTINAL: No abdominal or epigastric pain. No nausea, vomiting, or hematemesis; No diarrhea or constipation. No melena or hematochezia.  GENITOURINARY: No dysuria, frequency or hematuria  NEUROLOGICAL: No numbness or weakness  SKIN: No itching, burning, rashes, or lesions   All other review of systems is negative unless indicated above    ICU Vital Signs Last 24 Hrs  T(C): 37.6 (19 Nov 2020 07:53), Max: 37.6 (19 Nov 2020 07:53)  T(F): 99.6 (19 Nov 2020 07:53), Max: 99.6 (19 Nov 2020 07:53)  HR: 97 (19 Nov 2020 07:53) (66 - 97)  BP: 134/68 (19 Nov 2020 07:53) (116/72 - 153/71)  BP(mean): 83 (18 Nov 2020 18:25) (83 - 96)  ABP: --  ABP(mean): --  RR: 17 (19 Nov 2020 07:53) (15 - 18)  SpO2: 98% (19 Nov 2020 07:53) (98% - 99%)      I&O's Summary      CAPILLARY BLOOD GLUCOSE          PHYSICAL EXAM:    Constitutional: NAD, awake and alert, well-developed  HEENT: PERR, EOMI, Normal Hearing, MMM; poor dentition  Neck: Soft and supple, No LAD, No JVD  Respiratory: Breath sounds are clear bilaterally, No wheezing, rales or rhonchi  Cardiovascular: S1 and S2, regular rate and rhythm, no Murmurs, gallops or rubs  Gastrointestinal: Bowel Sounds present, soft, nontender, nondistended, no guarding, no rebound  Extremities: No peripheral edema  Vascular: 2+ peripheral pulses  Neurological: A/O x 3, no focal deficits  Musculoskeletal: 5/5 strength b/l upper and lower extremities  Skin: No rashes    MEDICATIONS:  MEDICATIONS  (STANDING):  ampicillin/sulbactam  IVPB 3 Gram(s) IV Intermittent every 6 hours  influenza   Vaccine 0.5 milliLiter(s) IntraMuscular once  loratadine 10 milliGRAM(s) Oral daily  metoprolol succinate ER 25 milliGRAM(s) Oral daily  oxybutynin 5 milliGRAM(s) Oral every 8 hours  saccharomyces boulardii 250 milliGRAM(s) Oral two times a day  sertraline 100 milliGRAM(s) Oral daily  venlafaxine XR. 150 milliGRAM(s) Oral daily      LABS: All Labs Reviewed:                        13.0   9.99  )-----------( 245      ( 19 Nov 2020 07:40 )             39.9     11-19    139  |  106  |  13  ----------------------------<  97  3.8   |  27  |  0.75    Ca    9.1      19 Nov 2020 07:40  Phos  3.1     11-19  Mg     2.0     11-19    TPro  7.8  /  Alb  3.7  /  TBili  0.4  /  DBili  x   /  AST  13<L>  /  ALT  18  /  AlkPhos  67  11-19              Blood Culture:     RADIOLOGY/EKG: reviewed

## 2020-11-19 NOTE — PROGRESS NOTE ADULT - ASSESSMENT
50 y/o F with PMHx of HTN, anxiety, urinary incontinence admitted with dental abscess.    # Subperiosteal abscess  - CT with right maxillary second molar tooth findings concerning for abscess  - start unasyn q6hrs for now; probiotics while on abx  - OMFS for fistula tracing, poss tooth extraction vs apico  - follow up blood cultures  - monitor for fevers  - pain control with tylenol, percocet and toradol  - [facility]. Pt with limited resources and missing numerous teeth. Will need recc from omfs regarding bridge vs implant therapy and what would be more cost effective for patient in the long term    #HTN  - continue toprol XL 25mg daily  - monitor hemodynamics    #Anxiety  - continue venlafaxine  - continue sertraline    #Urinary incontinence  - continue oxybutnin (5mg TID for 15mg ER)    #Vaginal candidiasis  - monitor - was prescribed fluconazole but told to hold    #Need for prophylactic measure  - VTE ppx: SCDs, ambulate as tolerated

## 2020-11-19 NOTE — CONSULT NOTE ADULT - SUBJECTIVE AND OBJECTIVE BOX
pt had acute onset of pain and swelling upper right side molar area, started on iv antibiotics (clinda/unasyn) swelling is improving, no sob, no eye swelling  exam-soft and tender selling right mucobuccal fold, no fluctaunce noted, eye is wnl, no lid edema, buccal swelling, airway patent, no trsimus  ct scan periapical radiolucencies right maxillary molars, no collection noted,     reviewed findings with patient, no surgical intervention needed at this time, non fluctuant swelling, pt may need endodontic therapy, recommend d/c pt on po antibiotics, (clindamycin or augmentin) and follow up with her private dentist for definitive treatment

## 2020-11-19 NOTE — CONSULT NOTE ADULT - ASSESSMENT
reviewed findings with patient, no surgical intervention needed at this time, non fluctuant swelling, pt may need endodontic therapy, recommend d/c pt on po antibiotics, (clindamycin or augmentin) and follow up with her private dentist for definitive treatment

## 2020-11-20 ENCOUNTER — TRANSCRIPTION ENCOUNTER (OUTPATIENT)
Age: 50
End: 2020-11-20

## 2020-11-20 VITALS
TEMPERATURE: 98 F | SYSTOLIC BLOOD PRESSURE: 112 MMHG | OXYGEN SATURATION: 99 % | HEART RATE: 81 BPM | RESPIRATION RATE: 18 BRPM | DIASTOLIC BLOOD PRESSURE: 58 MMHG

## 2020-11-20 LAB
SARS-COV-2 IGG SERPL QL IA: NEGATIVE — SIGNIFICANT CHANGE UP
SARS-COV-2 IGM SERPL IA-ACNC: 0.14 INDEX — SIGNIFICANT CHANGE UP

## 2020-11-20 PROCEDURE — 99239 HOSP IP/OBS DSCHRG MGMT >30: CPT

## 2020-11-20 RX ORDER — SACCHAROMYCES BOULARDII 250 MG
1 POWDER IN PACKET (EA) ORAL
Qty: 0 | Refills: 0 | DISCHARGE
Start: 2020-11-20

## 2020-11-20 RX ADMIN — OXYCODONE AND ACETAMINOPHEN 1 TABLET(S): 5; 325 TABLET ORAL at 05:52

## 2020-11-20 RX ADMIN — Medication 150 MILLIGRAM(S): at 09:03

## 2020-11-20 RX ADMIN — LORATADINE 10 MILLIGRAM(S): 10 TABLET ORAL at 09:03

## 2020-11-20 RX ADMIN — Medication 5 MILLIGRAM(S): at 05:48

## 2020-11-20 RX ADMIN — Medication 25 MILLIGRAM(S): at 09:03

## 2020-11-20 RX ADMIN — AMPICILLIN SODIUM AND SULBACTAM SODIUM 200 GRAM(S): 250; 125 INJECTION, POWDER, FOR SUSPENSION INTRAMUSCULAR; INTRAVENOUS at 00:03

## 2020-11-20 RX ADMIN — OXYCODONE AND ACETAMINOPHEN 1 TABLET(S): 5; 325 TABLET ORAL at 06:49

## 2020-11-20 RX ADMIN — Medication 250 MILLIGRAM(S): at 09:03

## 2020-11-20 RX ADMIN — SERTRALINE 100 MILLIGRAM(S): 25 TABLET, FILM COATED ORAL at 09:03

## 2020-11-20 RX ADMIN — AMPICILLIN SODIUM AND SULBACTAM SODIUM 200 GRAM(S): 250; 125 INJECTION, POWDER, FOR SUSPENSION INTRAMUSCULAR; INTRAVENOUS at 05:46

## 2020-11-20 NOTE — DISCHARGE NOTE NURSING/CASE MANAGEMENT/SOCIAL WORK - PATIENT PORTAL LINK FT
You can access the FollowMyHealth Patient Portal offered by [facility] by registering at the following website: [email or web address]. By joining [employer]’s FollowMyHealth portal, you will also be able to view your health information using other applications (apps) compatible with our system.

## 2020-11-20 NOTE — DISCHARGE NOTE PROVIDER - CARE PROVIDER_API CALL
DARA MUKHERJEE  Internal Medicine  [address] [place] [address]  Phone: [phone]  Fax: [phone]  Follow Up Time:     Chetan Pike  ORAL/MAXILLOFACIAL SURGERY  [address] [facility] [facility] [address]  [place] [address]  Phone: [phone]  Fax: [phone]  Follow Up Time:

## 2020-11-20 NOTE — DISCHARGE NOTE PROVIDER - NSDCMRMEDTOKEN_GEN_ALL_CORE_FT
Augmentin 875 mg-125 mg oral tablet: 1 tab(s) orally 2 times a day   ibuprofen 600 mg oral tablet: 1 tab(s) orally every 6 hours, As Needed for severe pain  Metoprolol Succinate ER 25 mg oral tablet, extended release: 1 tab(s) orally once a day  oxybutynin 15 mg/24 hr oral tablet, extended release: 1 tab(s) orally once a day  oxycodone-acetaminophen 5 mg-325 mg oral tablet: 1 tab(s) orally every 6 hours, As Needed for severe pain MDD:4  saccharomyces boulardii lyo 250 mg oral capsule: 1 cap(s) orally 2 times a day  sertraline 100 mg oral tablet: 1 tab(s) orally once a day  venlafaxine 150 mg oral capsule, extended release: 1 cap(s) orally once a day  ZyrTEC 10 mg oral tablet: 1 tab(s) orally once a day

## 2020-11-20 NOTE — DISCHARGE NOTE PROVIDER - NSDCCPCAREPLAN_GEN_ALL_CORE_FT
PRINCIPAL DISCHARGE DIAGNOSIS  Diagnosis: Dental abscess  Assessment and Plan of Treatment:       SECONDARY DISCHARGE DIAGNOSES  Diagnosis: Facial swelling  Assessment and Plan of Treatment:

## 2020-11-20 NOTE — DISCHARGE NOTE PROVIDER - HOSPITAL COURSE
Patient is a 49y old  Female who presents with a chief complaint of dental abscess (19 Nov 2020 19:08)      SUBJECTIVE:   HPI:  50 y/o F with PMHx of HTN, anxiety, urinary incontinence presented to the ED complaining of tooth pain. She started to feel pain on the right bottom side of her mouth the day before yesterday. Then, she started developing pain and swelling from the bottom of her face up to her right eye. She was having a lot pain and was taking anti-inflammatories - ibuprofen with improvement. She presented to the ED due to worsening pain and swelling after ER visit. She received oxycodone and augmentin. Still getting worse so she she came back. No fevers or chills. Denies chest pain, palpitations, dyspnea, headache, dizziness, abdominal pain, n/v/d. Has some vaginal itchiness, which she was given fluconazole for but was told not to start yet.    In the ED, VSS  Labs unremarkable  CXR with no acute disease  CT showed periapical lucencies involving the right maxillary second molar tooth with buccal cortex dehiscence and suggestion of a subcentimeter subperiosteal abscess. Associated superficial soft tissue swelling noted. (18 Nov 2020 20:17)        sub: swelling reduced. OMFS saw pt and recc o/p workup for periodontic treatment however pt has limited resources. I notified [facility]fs clinic who is willing to see patient today for possible debridement/periodontic treatment    REVIEW OF SYSTEMS:    CONSTITUTIONAL: No weakness, fevers or chills  EYES/ENT: No visual changes;  No vertigo or throat pain   NECK: No pain or stiffness  RESPIRATORY: No cough, wheezing, hemoptysis; No shortness of breath  CARDIOVASCULAR: No chest pain or palpitations  GASTROINTESTINAL: No abdominal or epigastric pain. No nausea, vomiting, or hematemesis; No diarrhea or constipation. No melena or hematochezia.  GENITOURINARY: No dysuria, frequency or hematuria  NEUROLOGICAL: No numbness or weakness  SKIN: No itching, burning, rashes, or lesions   All other review of systems is negative unless indicated above        ICU Vital Signs Last 24 Hrs  T(C): 36.7 (20 Nov 2020 07:52), Max: 36.9 (20 Nov 2020 00:11)  T(F): 98 (20 Nov 2020 07:52), Max: 98.4 (20 Nov 2020 00:11)  HR: 81 (20 Nov 2020 07:52) (81 - 90)  BP: 112/58 (20 Nov 2020 07:52) (112/58 - 132/70)  BP(mean): --  ABP: --  ABP(mean): --  RR: 18 (20 Nov 2020 07:52) (18 - 18)  SpO2: 99% (20 Nov 2020 07:52) (97% - 99%)            PHYSICAL EXAM:    Constitutional: NAD, awake and alert, well-developed  HEENT: PERR, EOMI, Normal Hearing, MMM; right maxillary swelling  Neck: Soft and supple, No LAD, No JVD  Respiratory: Breath sounds are clear bilaterally, No wheezing, rales or rhonchi  Cardiovascular: S1 and S2, regular rate and rhythm, no Murmurs, gallops or rubs  Gastrointestinal: Bowel Sounds present, soft, nontender, nondistended, no guarding, no rebound  Extremities: No peripheral edema  Vascular: 2+ peripheral pulses  Neurological: A/O x 3, no focal deficits  Musculoskeletal: 5/5 strength b/l upper and lower extremities  Skin: No rashes    MEDICATIONS:  MEDICATIONS  (STANDING):  ampicillin/sulbactam  IVPB 3 Gram(s) IV Intermittent every 6 hours  influenza   Vaccine 0.5 milliLiter(s) IntraMuscular once  loratadine 10 milliGRAM(s) Oral daily  metoprolol succinate ER 25 milliGRAM(s) Oral daily  oxybutynin 5 milliGRAM(s) Oral every 8 hours  saccharomyces boulardii 250 milliGRAM(s) Oral two times a day  sertraline 100 milliGRAM(s) Oral daily  venlafaxine XR. 150 milliGRAM(s) Oral daily      LABS: All Labs Reviewed:                        13.0   9.99  )-----------( 245      ( 19 Nov 2020 07:40 )             39.9     11-19    139  |  106  |  13  ----------------------------<  97  3.8   |  27  |  0.75    dc time: 48 min. Pt to d/c now straight to [facility] ER. PT agreeable

## 2020-11-24 LAB
CULTURE RESULTS: SIGNIFICANT CHANGE UP
CULTURE RESULTS: SIGNIFICANT CHANGE UP
SPECIMEN SOURCE: SIGNIFICANT CHANGE UP
SPECIMEN SOURCE: SIGNIFICANT CHANGE UP

## 2020-12-04 DIAGNOSIS — F41.9 ANXIETY DISORDER, UNSPECIFIED: ICD-10-CM

## 2020-12-04 DIAGNOSIS — K04.7 PERIAPICAL ABSCESS WITHOUT SINUS: ICD-10-CM

## 2020-12-04 DIAGNOSIS — I10 ESSENTIAL (PRIMARY) HYPERTENSION: ICD-10-CM

## 2020-12-04 DIAGNOSIS — H70.011: ICD-10-CM

## 2020-12-04 DIAGNOSIS — B37.9 CANDIDIASIS, UNSPECIFIED: ICD-10-CM

## 2020-12-04 DIAGNOSIS — R32 UNSPECIFIED URINARY INCONTINENCE: ICD-10-CM

## 2022-01-08 NOTE — DISCHARGE NOTE PROVIDER - PROVIDER RX CONTACT NUMBER
Alert-The patient is alert, awake and responds to voice. The patient is oriented to time, place, and person. The triage nurse is able to obtain subjective information. [phone]

## 2022-09-13 ENCOUNTER — NON-APPOINTMENT (OUTPATIENT)
Age: 52
End: 2022-09-13

## 2022-12-28 ENCOUNTER — NON-APPOINTMENT (OUTPATIENT)
Age: 52
End: 2022-12-28

## 2024-01-08 NOTE — ED ADULT NURSE NOTE - CHIEF COMPLAINT
Addended by: DAVIE BROWN on: 1/8/2024 09:28 AM     Modules accepted: Level of Service    
The patient is a 49y Female complaining of facial pain.